# Patient Record
Sex: FEMALE | Race: WHITE | HISPANIC OR LATINO | URBAN - METROPOLITAN AREA
[De-identification: names, ages, dates, MRNs, and addresses within clinical notes are randomized per-mention and may not be internally consistent; named-entity substitution may affect disease eponyms.]

---

## 2017-09-07 ENCOUNTER — EMERGENCY (EMERGENCY)
Facility: HOSPITAL | Age: 62
LOS: 1 days | Discharge: HOME | End: 2017-09-07
Attending: EMERGENCY MEDICINE
Payer: COMMERCIAL

## 2017-09-07 VITALS
DIASTOLIC BLOOD PRESSURE: 72 MMHG | HEIGHT: 64 IN | OXYGEN SATURATION: 98 % | TEMPERATURE: 99 F | HEART RATE: 57 BPM | RESPIRATION RATE: 18 BRPM | WEIGHT: 162.04 LBS | SYSTOLIC BLOOD PRESSURE: 150 MMHG

## 2017-09-07 LAB
APPEARANCE UR: CLEAR — SIGNIFICANT CHANGE UP
BILIRUB UR-MCNC: NEGATIVE — SIGNIFICANT CHANGE UP
COLOR SPEC: YELLOW — SIGNIFICANT CHANGE UP
COMMENT - URINE: SIGNIFICANT CHANGE UP
DIFF PNL FLD: NEGATIVE — SIGNIFICANT CHANGE UP
GLUCOSE UR QL: NEGATIVE MG/DL — SIGNIFICANT CHANGE UP
KETONES UR-MCNC: NEGATIVE — SIGNIFICANT CHANGE UP
LEUKOCYTE ESTERASE UR-ACNC: NEGATIVE — SIGNIFICANT CHANGE UP
NITRITE UR-MCNC: NEGATIVE — SIGNIFICANT CHANGE UP
PH UR: 6 — SIGNIFICANT CHANGE UP (ref 5–8)
PROT UR-MCNC: NEGATIVE MG/DL — SIGNIFICANT CHANGE UP
SP GR SPEC: 1 — LOW (ref 1.01–1.02)
UROBILINOGEN FLD QL: NEGATIVE MG/DL — SIGNIFICANT CHANGE UP
WBC UR QL: SIGNIFICANT CHANGE UP

## 2017-09-07 PROCEDURE — 87086 URINE CULTURE/COLONY COUNT: CPT

## 2017-09-07 PROCEDURE — 81001 URINALYSIS AUTO W/SCOPE: CPT

## 2017-09-07 PROCEDURE — T1013: CPT

## 2017-09-07 PROCEDURE — 99284 EMERGENCY DEPT VISIT MOD MDM: CPT

## 2017-09-07 PROCEDURE — 99283 EMERGENCY DEPT VISIT LOW MDM: CPT

## 2017-09-07 RX ORDER — IBUPROFEN 200 MG
600 TABLET ORAL ONCE
Qty: 0 | Refills: 0 | Status: COMPLETED | OUTPATIENT
Start: 2017-09-07 | End: 2017-09-07

## 2017-09-07 RX ORDER — DIAZEPAM 5 MG
5 TABLET ORAL ONCE
Qty: 0 | Refills: 0 | Status: DISCONTINUED | OUTPATIENT
Start: 2017-09-07 | End: 2017-09-07

## 2017-09-07 RX ADMIN — Medication 5 MILLIGRAM(S): at 11:53

## 2017-09-07 RX ADMIN — Medication 600 MILLIGRAM(S): at 11:53

## 2017-09-07 NOTE — ED STATDOCS - OBJECTIVE STATEMENT
61 y/o F pt presents to ED c/o constant right flank pain for  3 days. She notes pain has worsened today. She states pain worsens with movement. No OTC medication taken. denies fever. denies HA or neck pain. no chest pain or sob. no abd pain. no n/v/d. no urinary f/u/d. no back pain. no motor or sensory deficits. denies drug use. no recent travel. no rash. no other acute issues symptoms or concerns   : cici

## 2017-09-07 NOTE — ED STATDOCS - MEDICAL DECISION MAKING DETAILS
Check UA, pain medication Check UA, pain medication pain controlled fn=no fevers return fevefr intractable abd pain any overal worsening

## 2017-09-07 NOTE — ED ADULT NURSE NOTE - OBJECTIVE STATEMENT
61y/o female c/o right flank pain x 4 day pt states pain has increased. Pt denies ffever or chills, frequent, painful urination. urine yellow, clear. will continue to monitor

## 2017-09-07 NOTE — ED ADULT TRIAGE NOTE - CHIEF COMPLAINT QUOTE
C/P right flank and back pain and HA x 4 days. Denies fevers, chills, nausea, vomiting, diarrhea. Denies urinary complaints.

## 2017-09-07 NOTE — ED STATDOCS - PROGRESS NOTE DETAILS
61 yo F PT complaining of R flank pain x 3 days. Seen by intake MD, agree with H&P, orders, and plan. PE: MS: no vertebral tenderness, steady gait, N/V grossly intact. tender to touch of right lower back. -rashes, - ecchymosis. A/P: r/o UTI/ muscle strain, Urinalysis, pain management.

## 2017-09-08 LAB
CULTURE RESULTS: NO GROWTH — SIGNIFICANT CHANGE UP
SPECIMEN SOURCE: SIGNIFICANT CHANGE UP

## 2017-12-06 NOTE — ED ADULT NURSE NOTE - NS ED NURSE LEVEL OF CONSCIOUSNESS MENTAL STATUS
I have personally seen and examined this patient.  I have fully participated in the care of this patient. I have reviewed all pertinent clinical information, including history, physical exam, plan and the Resident’s note and agree except as noted.
Awake/Alert

## 2019-11-20 ENCOUNTER — OUTPATIENT (OUTPATIENT)
Dept: OUTPATIENT SERVICES | Facility: HOSPITAL | Age: 64
LOS: 1 days | End: 2019-11-20
Payer: COMMERCIAL

## 2019-11-20 ENCOUNTER — APPOINTMENT (OUTPATIENT)
Dept: MAMMOGRAPHY | Facility: CLINIC | Age: 64
End: 2019-11-20
Payer: COMMERCIAL

## 2019-11-20 DIAGNOSIS — Z00.00 ENCOUNTER FOR GENERAL ADULT MEDICAL EXAMINATION WITHOUT ABNORMAL FINDINGS: ICD-10-CM

## 2019-11-20 PROCEDURE — 77063 BREAST TOMOSYNTHESIS BI: CPT

## 2019-11-20 PROCEDURE — 77067 SCR MAMMO BI INCL CAD: CPT | Mod: 26

## 2019-11-20 PROCEDURE — 77063 BREAST TOMOSYNTHESIS BI: CPT | Mod: 26

## 2019-11-20 PROCEDURE — 77067 SCR MAMMO BI INCL CAD: CPT
